# Patient Record
(demographics unavailable — no encounter records)

---

## 2024-12-06 NOTE — HISTORY OF PRESENT ILLNESS
[de-identified] : 80 yo M with HTN, DM. HLD, BPH presents for f/u.  HTN-- pt reports he often forgets to take his meds. He does not own a pill box. He lives by himself.  DM-- diet controlled usually. Last a1c 7.2. Has been eating a lot of sweets. Not watching diet.  HLD-- on rosuvastatin 5mg and vascepa.

## 2025-05-09 NOTE — HISTORY OF PRESENT ILLNESS
[FreeTextEntry1] : f/u [de-identified] : 80 yo M with diet controlled DM, HTN, HLD presents for f/u.  Pt reports he has been watching his carbs.  HTN-- compliant with lisinopril-HCTZ. HLD-- rosuvastatin 10mg, not been on vascepa for 2 years.  Pt reports hasn't been taking tamsulosin. Having hesitancy. Did work well when he took it in the past.